# Patient Record
Sex: FEMALE | Race: WHITE | NOT HISPANIC OR LATINO | Employment: FULL TIME | URBAN - METROPOLITAN AREA
[De-identification: names, ages, dates, MRNs, and addresses within clinical notes are randomized per-mention and may not be internally consistent; named-entity substitution may affect disease eponyms.]

---

## 2023-11-09 PROBLEM — I45.6 WPW SYNDROME: Status: ACTIVE | Noted: 2023-11-09

## 2023-11-09 PROBLEM — I47.20 VENTRICULAR TACHYCARDIA (MULTI): Status: ACTIVE | Noted: 2023-11-09

## 2023-11-09 PROBLEM — J06.9 VIRAL URI: Status: ACTIVE | Noted: 2023-11-09

## 2023-11-09 PROBLEM — M54.50 LOW BACK PAIN: Status: ACTIVE | Noted: 2019-04-26

## 2023-11-09 PROBLEM — R07.89 CHEST DISCOMFORT: Status: ACTIVE | Noted: 2022-02-21

## 2023-11-09 PROBLEM — S39.012A BACK STRAIN: Status: ACTIVE | Noted: 2019-04-26

## 2023-11-09 PROBLEM — R94.31 ABNORMAL EKG: Status: ACTIVE | Noted: 2022-02-21

## 2023-11-09 PROBLEM — J32.9 SINUSITIS: Status: ACTIVE | Noted: 2023-11-09

## 2023-11-10 ENCOUNTER — OFFICE VISIT (OUTPATIENT)
Dept: PRIMARY CARE | Facility: CLINIC | Age: 33
End: 2023-11-10
Payer: COMMERCIAL

## 2023-11-10 ENCOUNTER — LAB (OUTPATIENT)
Dept: LAB | Facility: LAB | Age: 33
End: 2023-11-10
Payer: COMMERCIAL

## 2023-11-10 VITALS
HEIGHT: 68 IN | SYSTOLIC BLOOD PRESSURE: 124 MMHG | OXYGEN SATURATION: 98 % | HEART RATE: 60 BPM | WEIGHT: 176 LBS | BODY MASS INDEX: 26.67 KG/M2 | DIASTOLIC BLOOD PRESSURE: 72 MMHG

## 2023-11-10 DIAGNOSIS — I47.20 VENTRICULAR TACHYCARDIA (MULTI): ICD-10-CM

## 2023-11-10 DIAGNOSIS — Z00.00 WELL ADULT EXAM: ICD-10-CM

## 2023-11-10 DIAGNOSIS — Z11.51 SCREENING FOR HPV (HUMAN PAPILLOMAVIRUS): ICD-10-CM

## 2023-11-10 DIAGNOSIS — Z82.49 FAMILY HISTORY OF HEART DISEASE: ICD-10-CM

## 2023-11-10 DIAGNOSIS — Z01.419 WELL WOMAN EXAM: ICD-10-CM

## 2023-11-10 DIAGNOSIS — Z00.00 WELL ADULT EXAM: Primary | ICD-10-CM

## 2023-11-10 LAB
ALBUMIN SERPL-MCNC: 4.9 G/DL (ref 3.5–5)
ALP BLD-CCNC: 56 U/L (ref 35–125)
ALT SERPL-CCNC: 14 U/L (ref 5–40)
ANION GAP SERPL CALC-SCNC: 14 MMOL/L
AST SERPL-CCNC: 17 U/L (ref 5–40)
BASOPHILS # BLD AUTO: 0.05 X10*3/UL (ref 0–0.1)
BASOPHILS NFR BLD AUTO: 0.9 %
BILIRUB SERPL-MCNC: 0.5 MG/DL (ref 0.1–1.2)
BUN SERPL-MCNC: 11 MG/DL (ref 8–25)
CALCIUM SERPL-MCNC: 9.6 MG/DL (ref 8.5–10.4)
CHLORIDE SERPL-SCNC: 104 MMOL/L (ref 97–107)
CHOLEST SERPL-MCNC: 157 MG/DL (ref 133–200)
CHOLEST/HDLC SERPL: 2.7 {RATIO}
CO2 SERPL-SCNC: 24 MMOL/L (ref 24–31)
CREAT SERPL-MCNC: 0.9 MG/DL (ref 0.4–1.6)
EOSINOPHIL # BLD AUTO: 0.1 X10*3/UL (ref 0–0.7)
EOSINOPHIL NFR BLD AUTO: 1.7 %
ERYTHROCYTE [DISTWIDTH] IN BLOOD BY AUTOMATED COUNT: 11.7 % (ref 11.5–14.5)
GFR SERPL CREATININE-BSD FRML MDRD: 87 ML/MIN/1.73M*2
GLUCOSE SERPL-MCNC: 79 MG/DL (ref 65–99)
HCT VFR BLD AUTO: 42.4 % (ref 36–46)
HDLC SERPL-MCNC: 59 MG/DL
HGB BLD-MCNC: 14 G/DL (ref 12–16)
IMM GRANULOCYTES # BLD AUTO: 0.01 X10*3/UL (ref 0–0.7)
IMM GRANULOCYTES NFR BLD AUTO: 0.2 % (ref 0–0.9)
LDLC SERPL CALC-MCNC: 84 MG/DL (ref 65–130)
LYMPHOCYTES # BLD AUTO: 1.41 X10*3/UL (ref 1.2–4.8)
LYMPHOCYTES NFR BLD AUTO: 24.1 %
MCH RBC QN AUTO: 31.5 PG (ref 26–34)
MCHC RBC AUTO-ENTMCNC: 33 G/DL (ref 32–36)
MCV RBC AUTO: 96 FL (ref 80–100)
MONOCYTES # BLD AUTO: 0.48 X10*3/UL (ref 0.1–1)
MONOCYTES NFR BLD AUTO: 8.2 %
NEUTROPHILS # BLD AUTO: 3.79 X10*3/UL (ref 1.2–7.7)
NEUTROPHILS NFR BLD AUTO: 64.9 %
NRBC BLD-RTO: 0 /100 WBCS (ref 0–0)
PLATELET # BLD AUTO: 271 X10*3/UL (ref 150–450)
POTASSIUM SERPL-SCNC: 4.3 MMOL/L (ref 3.4–5.1)
PROT SERPL-MCNC: 6.9 G/DL (ref 5.9–7.9)
RBC # BLD AUTO: 4.44 X10*6/UL (ref 4–5.2)
SODIUM SERPL-SCNC: 142 MMOL/L (ref 133–145)
TRIGL SERPL-MCNC: 71 MG/DL (ref 40–150)
TSH SERPL DL<=0.05 MIU/L-ACNC: 2.3 MIU/L (ref 0.27–4.2)
WBC # BLD AUTO: 5.8 X10*3/UL (ref 4.4–11.3)

## 2023-11-10 PROCEDURE — 36415 COLL VENOUS BLD VENIPUNCTURE: CPT

## 2023-11-10 PROCEDURE — 87624 HPV HI-RISK TYP POOLED RSLT: CPT

## 2023-11-10 PROCEDURE — 84443 ASSAY THYROID STIM HORMONE: CPT

## 2023-11-10 PROCEDURE — 80053 COMPREHEN METABOLIC PANEL: CPT

## 2023-11-10 PROCEDURE — 85025 COMPLETE CBC W/AUTO DIFF WBC: CPT

## 2023-11-10 PROCEDURE — 80061 LIPID PANEL: CPT

## 2023-11-10 PROCEDURE — 88175 CYTOPATH C/V AUTO FLUID REDO: CPT

## 2023-11-10 PROCEDURE — 99395 PREV VISIT EST AGE 18-39: CPT | Performed by: NURSE PRACTITIONER

## 2023-11-10 PROCEDURE — 1036F TOBACCO NON-USER: CPT | Performed by: NURSE PRACTITIONER

## 2023-11-10 ASSESSMENT — PATIENT HEALTH QUESTIONNAIRE - PHQ9
2. FEELING DOWN, DEPRESSED OR HOPELESS: NOT AT ALL
SUM OF ALL RESPONSES TO PHQ9 QUESTIONS 1 AND 2: 0
1. LITTLE INTEREST OR PLEASURE IN DOING THINGS: NOT AT ALL

## 2023-11-10 ASSESSMENT — PAIN SCALES - GENERAL: PAINLEVEL: 0-NO PAIN

## 2023-11-10 NOTE — PROGRESS NOTES
Subjective   Patient ID: Jr Mcdonnell is a 33 y.o. female who presents for CPE.    HPI   JR is seen for for her comprehensive physical exam. PMH, PSH, family history and social history were reviewed and updated.   Father  age 53 dilated cardiomyopathy  Mom breast cancer age 62    Diet could better   Exercise - walks 3x per week  nonsmoker    Cardiology in  Dr Bailey  Dx with shortened VT and advised echo which she has not done yet   Anxious and stress are triggers    GYN history - Last Pap was in 2017 and was normal . HPV was negative.   Sexually active with same partner. Periods regular.   LMP 10/26/23 regular    Review of Systems  Constitutional Symptoms: negative for fever, loss of appetite, headaches, fatigue.   Eyes: negative for loss and blurring of vision, double vision.   Ear, Nose, Mouth, Throat: negative for hearing loss, tinnitus, nasal congestion, rhinorrhea, nose bleeds, teeth problems, mouth sores, gum disease, dysphagia, sore throat.   Cardiovascular: negative for chest pain/pressure, palpitations, edema, claudication.   Respiratory: negative for shortness of breath, dyspnea on exertion, pain with breathing, coughing.   Breast: negative for tenderness, masses, gynecomastia.   Gastrointestinal: negative for anorexia, indigestion, nausea, vomiting, abdominal pain, change in bowel habits, diarrhea, constipation, hematochaezia, melena, blood in stool.    : Negative for urinary or vaginal complaints  Musculoskeletal: negative for joint pain, joint swelling, myalgias, cramps.   Integumentary: negative for change in mole, skin trouble or rash.   Neurological: negative for headache, numbness, tingling, weakness, tremors.   Psychiatric: negative for depression, anxiety.   Endocrine: negative for weight gain, heat or cold intolerance, polyuria, polydipsia, polyphagia.   Hematologic/Lymphatic: negative for bruising, abnormal bleeding, swollen glands.    Objective   /72   Pulse 60    "Ht 1.727 m (5' 8\")   Wt 79.8 kg (176 lb)   LMP 10/26/2023 (Exact Date)   SpO2 98%   BMI 26.76 kg/m²     Physical Exam  General Appearance: Comfortable. She is well nourished, and well developed. She is awake, alert, and oriented and appears her stated age. The patient is cooperative with exam.  Head: Hair pattern reveals a normal pattern for patients age and The face shows no abnormalities .  Eyes: PERRLA, EOMI, conjunctiva and sclerae clear. Extraocular muscle exam reveals EOMI.  Ears, Nose, Mouth, Throat: Bilateral canals are normal. Both tympanic membranes are pearly gray and landmarks normal .   NOSE: Nasal mucosa in both nostrils reveals no polyps, ulcerations, or lesions. Oral mucosa reveals no abnormalities and Teeth reveal good repair .  Neck: Neck reveals supple, no adenopathy, no thyromegaly, or carotid bruits.  Chest: Lungs are clear to auscultation bilaterally with no wheezes, rales, or rhonchi.  Cardiovascular: RRR without MRG.  Breast: Breasts: Bilateral breasts are symmetrical without masses or discharge.  Abdomen: Abdomen is soft, NT, ND with no masses.  Genitourinary: Bilateral labia reveals no lesions. Vagina mucosa reveals no abnormalities and There is no bulges . Cervix reveals no abnormalities. Uterus is normal in size and normal in position . Bilateral adnexa reveals no masses and no tenderness .  Lymph Nodes: Bilateral axillary lymph nodes are unremarkable. Bilateral inguinal lymph nodes are unremarkable.  Musculoskeletal: 5/5 and equal strength in bilateral upper and lower extremities.  Skin: Skin reveals good turgor and no rashes .  Neurological: Neuro: Intact and non-focal. Cranial nerves II - XII are grossly intact.  Psychiatric: Patient has appropriate judgement. Patient has good insight. Patient's mood is appropriate.    Assessment/Plan   Diagnoses and all orders for this visit:  Well adult exam  -     CBC and Auto Differential; Future  -     Comprehensive Metabolic Panel; Future  -  "    Lipid Panel; Future  -     TSH with reflex to Free T4 if abnormal; Future  Healthy diet  Exercise  Safety  Await test results  Well woman exam  -     THINPREP PAP TEST  -     HPV DNA High Risk With Genotype  Screening for HPV (human papillomavirus)  -     THINPREP PAP TEST  -     HPV DNA High Risk With Genotype  Family history of heart disease  -     Comprehensive Metabolic Panel; Future  -     Lipid Panel; Future  Ventricular tachycardia (CMS/HCC)  -     CBC and Auto Differential; Future  -     Comprehensive Metabolic Panel; Future  -     TSH with reflex to Free T4 if abnormal; Future

## 2023-11-10 NOTE — PATIENT INSTRUCTIONS
Thank you for choosing our office for your health care needs    Work on healthy lifestyle including whole foods and regular exercise    A pap test was done, await results    Obtain blood work as ordered  Follow up will be based on results.

## 2023-12-11 ENCOUNTER — APPOINTMENT (OUTPATIENT)
Dept: PRIMARY CARE | Facility: CLINIC | Age: 33
End: 2023-12-11
Payer: COMMERCIAL

## 2023-12-20 ENCOUNTER — OFFICE VISIT (OUTPATIENT)
Dept: PRIMARY CARE | Facility: CLINIC | Age: 33
End: 2023-12-20
Payer: COMMERCIAL

## 2023-12-20 VITALS
OXYGEN SATURATION: 98 % | HEART RATE: 68 BPM | DIASTOLIC BLOOD PRESSURE: 70 MMHG | BODY MASS INDEX: 28.28 KG/M2 | SYSTOLIC BLOOD PRESSURE: 122 MMHG | WEIGHT: 186 LBS

## 2023-12-20 DIAGNOSIS — R87.618 OTHER ABNORMAL CYTOLOGICAL FINDING OF SPECIMEN FROM CERVIX: ICD-10-CM

## 2023-12-20 DIAGNOSIS — N76.0 ACUTE VAGINITIS: Primary | ICD-10-CM

## 2023-12-20 PROCEDURE — 87800 DETECT AGNT MULT DNA DIREC: CPT

## 2023-12-20 PROCEDURE — 87205 SMEAR GRAM STAIN: CPT

## 2023-12-20 PROCEDURE — 1036F TOBACCO NON-USER: CPT | Performed by: NURSE PRACTITIONER

## 2023-12-20 PROCEDURE — 99213 OFFICE O/P EST LOW 20 MIN: CPT | Performed by: NURSE PRACTITIONER

## 2023-12-20 NOTE — PROGRESS NOTES
Subjective   Patient ID: Aleyda Mcdonnell is a 33 y.o. female who presents for Follow-up (Pap smear. No vaginal symptoms currently.).    HPI   Pt presents for testing after previous pap showed partially obscuring inflammation    Pt denies any complaints today    Review of Systems  ROS negative    Objective   /70   Pulse 68   Wt 84.4 kg (186 lb)   SpO2 98%   BMI 28.28 kg/m²     Physical Exam  General Appearance: Comfortable. She is well nourished, and well developed. She is awake, alert, and oriented and appears her stated age. The patient is cooperative with exam.  Abdomen: Abdomen is soft, NT, ND with no masses.  Genitourinary: Bilateral labia reveals no lesions. Vagina mucosa reveals no abnormalities and There is no bulges . Cervix reveals no abnormalities. Uterus is normal in size and normal in position . Bilateral adnexa reveals no masses and no tenderness .  Lymph Nodes: Bilateral axillary lymph nodes are unremarkable. Bilateral inguinal lymph nodes are unremarkable.  Skin: Skin reveals good turgor and no rashes .  Neurological: Neuro: Intact and non-focal. Cranial nerves II - XII are grossly intact.    Assessment/Plan   Diagnoses and all orders for this visit:  Acute vaginitis  -     C. Trachomatis / N. Gonorrhoeae, Amplified Detection  -     Vaginitis Gram Stain For Bacterial Vaginosis + Yeast  Other abnormal cytological finding of specimen from cervix    Needs evaluation due to abnormal pap  No complaints  Testing ordered  Pap in 6 months

## 2023-12-21 LAB
C TRACH RRNA SPEC QL NAA+PROBE: NEGATIVE
CLUE CELLS VAG LPF-#/AREA: NORMAL /[LPF]
N GONORRHOEA DNA SPEC QL PROBE+SIG AMP: NEGATIVE
NUGENT SCORE: 1
YEAST VAG WET PREP-#/AREA: NORMAL

## 2024-03-29 ENCOUNTER — OFFICE VISIT (OUTPATIENT)
Dept: PRIMARY CARE | Facility: CLINIC | Age: 34
End: 2024-03-29
Payer: COMMERCIAL

## 2024-03-29 ENCOUNTER — HOSPITAL ENCOUNTER (OUTPATIENT)
Dept: RADIOLOGY | Facility: CLINIC | Age: 34
Discharge: HOME | End: 2024-03-29
Payer: COMMERCIAL

## 2024-03-29 VITALS
OXYGEN SATURATION: 99 % | BODY MASS INDEX: 26.46 KG/M2 | WEIGHT: 174 LBS | DIASTOLIC BLOOD PRESSURE: 80 MMHG | HEART RATE: 68 BPM | SYSTOLIC BLOOD PRESSURE: 124 MMHG

## 2024-03-29 DIAGNOSIS — M25.562 ACUTE PAIN OF LEFT KNEE: ICD-10-CM

## 2024-03-29 PROCEDURE — 99213 OFFICE O/P EST LOW 20 MIN: CPT | Performed by: NURSE PRACTITIONER

## 2024-03-29 PROCEDURE — 73562 X-RAY EXAM OF KNEE 3: CPT | Mod: LEFT SIDE | Performed by: RADIOLOGY

## 2024-03-29 PROCEDURE — 73562 X-RAY EXAM OF KNEE 3: CPT | Mod: LT

## 2024-03-29 ASSESSMENT — PAIN SCALES - GENERAL: PAINLEVEL: 2

## 2024-03-29 NOTE — PATIENT INSTRUCTIONS
Obtain x-ray as ordered    Use anti-inflammatories as discussed    Heat, knee sleeve, stretching  Obtain physical therapy

## 2024-03-29 NOTE — PROGRESS NOTES
"Subjective   Patient ID: Aleyda Fajardo is a 34 y.o. female who presents for Knee Pain (Left knee pain, X few months. No known injury.).    HPI   Pt with left knee pain for past few months  Pain in back of knee, no instability  No trauma  Worse with movement and walking  Makes \"weird sounds\" when walking on stairs/bending  No numbness or tingling, swollen, redness  Wears knee brace/sleeve    Review of Systems  ROS negative with exceptions above    Objective   /80   Pulse 68   Wt 78.9 kg (174 lb)   SpO2 99%   BMI 26.46 kg/m²     Physical Exam  A/O x3 NAD  Lungs CTA bilaterally  Heart with RRR  Skin warm and dry  Left knee with posterior tenderness. FROM knee with FROM. Negative varus and valgus instability. Negative apleys. 3+ DP bilaterally. MSPs intact to toes.     Assessment/Plan   Diagnoses and all orders for this visit:  Acute pain of left knee  -     Referral to Physical Therapy; Future  Xray ordered  Antiinflammatories as directed  Ice/heat, topicals, compression  Knee exercises and consider PT  Follow up 4-6 weeks     "

## 2024-12-11 ENCOUNTER — APPOINTMENT (OUTPATIENT)
Dept: PRIMARY CARE | Facility: CLINIC | Age: 34
End: 2024-12-11
Payer: COMMERCIAL

## 2024-12-17 ENCOUNTER — APPOINTMENT (OUTPATIENT)
Dept: PRIMARY CARE | Facility: CLINIC | Age: 34
End: 2024-12-17
Payer: COMMERCIAL

## 2024-12-24 ENCOUNTER — TELEPHONE (OUTPATIENT)
Dept: PRIMARY CARE | Facility: CLINIC | Age: 34
End: 2024-12-24
Payer: COMMERCIAL

## 2025-01-20 ENCOUNTER — APPOINTMENT (OUTPATIENT)
Dept: PRIMARY CARE | Facility: CLINIC | Age: 35
End: 2025-01-20
Payer: COMMERCIAL

## 2025-01-20 VITALS
WEIGHT: 170 LBS | SYSTOLIC BLOOD PRESSURE: 116 MMHG | BODY MASS INDEX: 25.76 KG/M2 | DIASTOLIC BLOOD PRESSURE: 80 MMHG | HEART RATE: 56 BPM | HEIGHT: 68 IN | OXYGEN SATURATION: 99 %

## 2025-01-20 DIAGNOSIS — Z31.69 PRE-CONCEPTION COUNSELING: ICD-10-CM

## 2025-01-20 DIAGNOSIS — Z23 ENCOUNTER FOR IMMUNIZATION: ICD-10-CM

## 2025-01-20 DIAGNOSIS — Z00.00 ANNUAL PHYSICAL EXAM: Primary | ICD-10-CM

## 2025-01-20 PROCEDURE — 3008F BODY MASS INDEX DOCD: CPT

## 2025-01-20 PROCEDURE — 90656 IIV3 VACC NO PRSV 0.5 ML IM: CPT

## 2025-01-20 PROCEDURE — 90471 IMMUNIZATION ADMIN: CPT

## 2025-01-20 PROCEDURE — 99395 PREV VISIT EST AGE 18-39: CPT

## 2025-01-20 PROCEDURE — 1036F TOBACCO NON-USER: CPT

## 2025-01-20 RX ORDER — TRETINOIN 0.25 MG/G
CREAM TOPICAL NIGHTLY
COMMUNITY
Start: 2025-01-05

## 2025-01-20 ASSESSMENT — VISUAL ACUITY: OU: 1

## 2025-01-20 ASSESSMENT — PATIENT HEALTH QUESTIONNAIRE - PHQ9
SUM OF ALL RESPONSES TO PHQ9 QUESTIONS 1 AND 2: 0
2. FEELING DOWN, DEPRESSED OR HOPELESS: NOT AT ALL
1. LITTLE INTEREST OR PLEASURE IN DOING THINGS: NOT AT ALL

## 2025-01-20 ASSESSMENT — COLUMBIA-SUICIDE SEVERITY RATING SCALE - C-SSRS: 1. IN THE PAST MONTH, HAVE YOU WISHED YOU WERE DEAD OR WISHED YOU COULD GO TO SLEEP AND NOT WAKE UP?: NO

## 2025-01-20 ASSESSMENT — PAIN SCALES - GENERAL: PAINLEVEL_OUTOF10: 0-NO PAIN

## 2025-01-20 NOTE — PROGRESS NOTES
"Subjective   Patient ID: Aleyda Fajardo is a 34 y.o. female who presents for Annual Exam.    HPI     Aleyda Fajardo presents for annual physical exam.       Patient's recent visit notes, medication and allergy lists, past medical surgical social hx, immunization, vitals, problem list, recent tests were reviewed by me for pertinence to this visit.      PMH:     Palpations/chest tightened- saw Cardiology in  Dr Bailey  Dx with shortened MD and advised echo which she has not done yet   Anxious and stress are triggers        Family History:  Father  age 53 dilated cardiomyopathy  Mom breast cancer age 62    Social Hx:  , no children  Working FT   Smoking: No  Alcohol: Yes - socially  Recreational drug use: No      Screening tools:  GYN history - Last Pap was in  and was normal . HPV was negative.   Sexually active with same partner. Periods regular. Interested in pregnancy soon.         Vaccinations:  Tdap: 2018  Flu Vaccine: update today      Review of Systems  GENERAL - Denies fever/chills, recent illness, unexplained weight loss  HEENT- Denies change in vision, double vision, blurred. Denies hearing changes, ear pain. Denies nose bleeds. Denies sore throat, difficulty swallowing.    RESP - Denies SOB or cough  CVS - Denies CP, palpitations  GI - Denies nausea or abdominal pain, hematochezia/melena  - Denies urinary frequency, urgency or incontinence.  Denies nocturia.   NEURO - Denies headache, dizziness  MSK - Denies joint, neck or back pain  Skin - Denies abnormal lesions, rash  PSYCH-Denies anxiety, depression, changes in mood        Objective     /80   Pulse 56   Ht 1.727 m (5' 8\")   Wt 77.1 kg (170 lb)   SpO2 99%   BMI 25.85 kg/m²     Physical Exam  Vitals and nursing note reviewed.   Constitutional:       General: She is not in acute distress.     Appearance: Normal appearance. She is well-developed and well-groomed.   HENT:      Head: Normocephalic.      " Jaw: There is normal jaw occlusion.      Right Ear: Hearing, tympanic membrane, ear canal and external ear normal.      Left Ear: Hearing, tympanic membrane, ear canal and external ear normal.      Nose: Nose normal.      Mouth/Throat:      Lips: Pink.      Mouth: Mucous membranes are moist.      Pharynx: Oropharynx is clear. Uvula midline.   Eyes:      General: Lids are normal. Vision grossly intact. Gaze aligned appropriately.      Extraocular Movements: Extraocular movements intact.      Conjunctiva/sclera: Conjunctivae normal.      Pupils: Pupils are equal, round, and reactive to light.   Neck:      Thyroid: No thyromegaly or thyroid tenderness.      Vascular: No carotid bruit or JVD.      Trachea: Trachea and phonation normal.   Cardiovascular:      Rate and Rhythm: Normal rate and regular rhythm.      Pulses: Normal pulses.      Heart sounds: Normal heart sounds, S1 normal and S2 normal.   Pulmonary:      Effort: Pulmonary effort is normal.      Breath sounds: Normal breath sounds and air entry.   Abdominal:      General: Bowel sounds are normal. There is no distension.      Palpations: Abdomen is soft. There is no hepatomegaly, splenomegaly or mass.      Tenderness: There is no abdominal tenderness. There is no right CVA tenderness, left CVA tenderness, guarding or rebound.   Musculoskeletal:         General: Normal range of motion.      Cervical back: Normal, full passive range of motion without pain, normal range of motion and neck supple.      Thoracic back: Normal.      Lumbar back: Normal.      Right lower leg: No edema.      Left lower leg: No edema.   Lymphadenopathy:      Cervical: No cervical adenopathy.   Skin:     General: Skin is warm and dry.      Capillary Refill: Capillary refill takes less than 2 seconds.   Neurological:      General: No focal deficit present.      Mental Status: She is alert and oriented to person, place, and time.      Cranial Nerves: Cranial nerves 2-12 are intact.       Sensory: Sensation is intact.      Motor: Motor function is intact.      Coordination: Coordination is intact.      Gait: Gait is intact.   Psychiatric:         Attention and Perception: Attention normal.         Mood and Affect: Mood and affect normal.         Speech: Speech normal.         Behavior: Behavior normal. Behavior is cooperative.           Assessment & Plan  Annual physical exam  Well adult exam.  1. Age appropriate preventative measures reviewed.   2. Encouraged healthy diet and exercise.  3. Immunizations- Reviewed, Tdap UTD, flu update today  4. Labs- not indicated at this visit, lipid panel stable last check  5. Medications- Reviewed    *Follow-up in 1 year for repeat annual physical exam. Patient verbalizes understanding  regarding plan of care and all questions answered.         Pre-conception counseling  Referral to OB for pre-conception counseling  Discussed ovulation and best timing of her cycle for conception.   Orders:    Referral to Obstetrics / Gynecology; Future    Encounter for immunization    Orders:    Flu vaccine, trivalent, preservative free, age 6 months and greater (Fluarix/Fluzone/Flulaval)

## 2025-02-18 ENCOUNTER — OFFICE VISIT (OUTPATIENT)
Dept: URGENT CARE | Age: 35
End: 2025-02-18
Payer: COMMERCIAL

## 2025-02-18 VITALS
OXYGEN SATURATION: 98 % | WEIGHT: 170 LBS | TEMPERATURE: 97.9 F | HEART RATE: 71 BPM | SYSTOLIC BLOOD PRESSURE: 119 MMHG | DIASTOLIC BLOOD PRESSURE: 77 MMHG | BODY MASS INDEX: 25.85 KG/M2 | RESPIRATION RATE: 16 BRPM

## 2025-02-18 DIAGNOSIS — L30.9 ACUTE DERMATITIS: Primary | ICD-10-CM

## 2025-02-18 PROCEDURE — 1036F TOBACCO NON-USER: CPT | Performed by: PHYSICIAN ASSISTANT

## 2025-02-18 PROCEDURE — 99213 OFFICE O/P EST LOW 20 MIN: CPT | Performed by: PHYSICIAN ASSISTANT

## 2025-02-18 RX ORDER — VALACYCLOVIR HYDROCHLORIDE 1 G/1
1000 TABLET, FILM COATED ORAL 3 TIMES DAILY
Qty: 21 TABLET | Refills: 0 | Status: SHIPPED | OUTPATIENT
Start: 2025-02-18 | End: 2025-02-25

## 2025-02-18 ASSESSMENT — ENCOUNTER SYMPTOMS
DIAPHORESIS: 0
CHILLS: 0
FEVER: 0
SHORTNESS OF BREATH: 0

## 2025-02-18 NOTE — PROGRESS NOTES
Subjective   Patient ID: Aleyda Fajardo is a 34 y.o. female. They present today with a chief complaint of Rash (Rash lower back with pain).    History of Present Illness  Burning pain right low back started 2 days ago, rash started yesterday.          Rash  Pertinent negatives include no fever or shortness of breath.       Past Medical History  Allergies as of 02/18/2025    (No Known Allergies)       (Not in a hospital admission)       History reviewed. No pertinent past medical history.    History reviewed. No pertinent surgical history.     reports that she has never smoked. She has never used smokeless tobacco. She reports that she does not use drugs.    Review of Systems  Review of Systems   Constitutional:  Negative for chills, diaphoresis and fever.   Respiratory:  Negative for shortness of breath.    Cardiovascular:  Negative for chest pain.   Skin:  Positive for rash.   All other systems reviewed and are negative.                                 Objective    Vitals:    02/18/25 0817   BP: 119/77   Pulse: 71   Resp: 16   Temp: 36.6 °C (97.9 °F)   TempSrc: Oral   SpO2: 98%   Weight: 77.1 kg (170 lb)     No LMP recorded.    Physical Exam  Vitals and nursing note reviewed.   Constitutional:       General: She is not in acute distress.  Cardiovascular:      Rate and Rhythm: Normal rate.   Pulmonary:      Effort: Pulmonary effort is normal.   Skin:     Comments: Erythematous rash left low lateral back   Neurological:      Mental Status: She is alert.         Procedures    Point of Care Test & Imaging Results from this visit  No results found for this visit on 02/18/25.   No results found.    Diagnostic study results (if any) were reviewed by Elizabeth Phelps PA-C.    Assessment/Plan   Allergies, medications, history, and pertinent labs/EKGs/Imaging reviewed by Elizabeth Phelps PA-C.       Orders and Diagnoses  There are no diagnoses linked to this encounter.    Medical Admin Record      Patient disposition:  Home    Electronically signed by Elizabeth Phelps PA-C  8:21 AM

## 2025-03-11 ENCOUNTER — OFFICE VISIT (OUTPATIENT)
Dept: OBSTETRICS AND GYNECOLOGY | Facility: HOSPITAL | Age: 35
End: 2025-03-11
Payer: COMMERCIAL

## 2025-03-11 VITALS
HEIGHT: 68 IN | WEIGHT: 165 LBS | DIASTOLIC BLOOD PRESSURE: 82 MMHG | SYSTOLIC BLOOD PRESSURE: 135 MMHG | BODY MASS INDEX: 25.01 KG/M2

## 2025-03-11 DIAGNOSIS — Z31.69 PRE-CONCEPTION COUNSELING: Primary | ICD-10-CM

## 2025-03-11 DIAGNOSIS — Z23 NEED FOR HPV VACCINATION: ICD-10-CM

## 2025-03-11 DIAGNOSIS — Z71.85 HPV VACCINE COUNSELING: ICD-10-CM

## 2025-03-11 LAB — PREGNANCY TEST URINE, POC: NEGATIVE

## 2025-03-11 PROCEDURE — 90471 IMMUNIZATION ADMIN: CPT | Performed by: STUDENT IN AN ORGANIZED HEALTH CARE EDUCATION/TRAINING PROGRAM

## 2025-03-11 PROCEDURE — 3008F BODY MASS INDEX DOCD: CPT | Performed by: STUDENT IN AN ORGANIZED HEALTH CARE EDUCATION/TRAINING PROGRAM

## 2025-03-11 PROCEDURE — 99203 OFFICE O/P NEW LOW 30 MIN: CPT | Performed by: STUDENT IN AN ORGANIZED HEALTH CARE EDUCATION/TRAINING PROGRAM

## 2025-03-11 PROCEDURE — 81025 URINE PREGNANCY TEST: CPT | Performed by: STUDENT IN AN ORGANIZED HEALTH CARE EDUCATION/TRAINING PROGRAM

## 2025-03-11 PROCEDURE — 99213 OFFICE O/P EST LOW 20 MIN: CPT | Mod: 25 | Performed by: STUDENT IN AN ORGANIZED HEALTH CARE EDUCATION/TRAINING PROGRAM

## 2025-03-11 SDOH — ECONOMIC STABILITY: FOOD INSECURITY: WITHIN THE PAST 12 MONTHS, YOU WORRIED THAT YOUR FOOD WOULD RUN OUT BEFORE YOU GOT MONEY TO BUY MORE.: NEVER TRUE

## 2025-03-11 SDOH — ECONOMIC STABILITY: FOOD INSECURITY: WITHIN THE PAST 12 MONTHS, THE FOOD YOU BOUGHT JUST DIDN'T LAST AND YOU DIDN'T HAVE MONEY TO GET MORE.: NEVER TRUE

## 2025-03-11 ASSESSMENT — SOCIAL DETERMINANTS OF HEALTH (SDOH)
WITHIN THE LAST YEAR, HAVE YOU BEEN KICKED, HIT, SLAPPED, OR OTHERWISE PHYSICALLY HURT BY YOUR PARTNER OR EX-PARTNER?: NO
WITHIN THE LAST YEAR, HAVE YOU BEEN AFRAID OF YOUR PARTNER OR EX-PARTNER?: NO
WITHIN THE LAST YEAR, HAVE TO BEEN RAPED OR FORCED TO HAVE ANY KIND OF SEXUAL ACTIVITY BY YOUR PARTNER OR EX-PARTNER?: NO
WITHIN THE LAST YEAR, HAVE YOU BEEN HUMILIATED OR EMOTIONALLY ABUSED IN OTHER WAYS BY YOUR PARTNER OR EX-PARTNER?: NO

## 2025-03-11 ASSESSMENT — PAIN SCALES - GENERAL: PAINLEVEL_OUTOF10: 0-NO PAIN

## 2025-03-11 ASSESSMENT — PATIENT HEALTH QUESTIONNAIRE - PHQ9
2. FEELING DOWN, DEPRESSED OR HOPELESS: NOT AT ALL
SUM OF ALL RESPONSES TO PHQ9 QUESTIONS 1 & 2: 0
1. LITTLE INTEREST OR PLEASURE IN DOING THINGS: NOT AT ALL

## 2025-03-11 NOTE — PROGRESS NOTES
Subjective   Patient ID: Aleyda Fajardo is a 35 y.o. female who presents for Consult (Discuss fertility issues/Last 11/10/2023 neg HPV neg/Declined chaperone [facility]).  Presents to discuss getting pregnant. She estimates that she has been having regular unprotected sex for several years with her male partner. Neither she nor her partner have any children and she affirms she has not been pregnant before. Recalls no prior history of pelvic infection, painful menses, pelvic pain, pain with intercourse. Menses are regular, not particularly heavy, occur as expected according to menstrual tracking ruth ann.    Mother had two miscarriages. Patient has a sister with one child who was conceived spontaneously.    No known history of thromboembolic disease Father had arrhythmia and was on anticoagulation, ultimately passed from dilated cardiomyopathy.     Patient herself saw cardiology and was diagnosed with short TX interval. Echo was recommended. Has not had a chance to complete yet.    She is otherwise in good health with no major medical conditions.    No prior HPV vaccine. Amenable to receive today. Vaccinations otherwise up to date.    No prior fertility testing or treatment.        Review of Systems   All other systems reviewed and are negative.      Objective   Physical Exam  Constitutional:       Appearance: Normal appearance.   HENT:      Head: Normocephalic and atraumatic.      Nose: Nose normal.      Mouth/Throat:      Mouth: Mucous membranes are moist.      Pharynx: No oropharyngeal exudate or posterior oropharyngeal erythema.   Eyes:      Extraocular Movements: Extraocular movements intact.      Conjunctiva/sclera: Conjunctivae normal.   Pulmonary:      Effort: Pulmonary effort is normal.   Musculoskeletal:      Cervical back: Normal range of motion.   Skin:     Findings: No bruising, erythema, lesion or rash.   Neurological:      General: No focal deficit present.      Mental Status: She is alert.   Psychiatric:          Mood and Affect: Mood normal.         Behavior: Behavior normal.         Thought Content: Thought content normal.         Judgment: Judgment normal.         Assessment/Plan   36 yo G0 with no significant past medical history, meeting criteria for diagnosis of infertility  - for preconception health optimization, we discussed initiating prenatal vitamin use, completion of echocardiogram as recommended by her cardiologist, and HPV vaccination, and she is amenable  - she is interested in carrier screening for cystic fibrosis and SMA  - she is amenable to obtain varicella and rubella titers, would desire re-vaccination prior to pregnancy if found to be non-immune. Of note, she does recall having chickenpox in childhood and had shingles recently.  - discussed that, given duration of time attempting to conceive today as well as age, would recommend [facility] referral and she was accepting of this recommendation. Referral placed.    Will follow up results of screening. Otherwise return as needed or for well care or prenatal care.    MD Daly Menezes MD 03/11/25 3:18 PM

## 2025-03-12 LAB
RUBV IGG SERPL IA-ACNC: 8.09 INDEX
VZV IGG SER IA-ACNC: 8.57 S/CO

## 2025-03-13 LAB
ELECTRONICALLY SIGNED BY: NORMAL
SMA RESULT: NORMAL

## 2025-03-19 LAB
CFTR MUT ANL BLD/T: NORMAL
ELECTRONICALLY SIGNED BY: NORMAL